# Patient Record
Sex: FEMALE | URBAN - METROPOLITAN AREA
[De-identification: names, ages, dates, MRNs, and addresses within clinical notes are randomized per-mention and may not be internally consistent; named-entity substitution may affect disease eponyms.]

---

## 2019-11-15 ENCOUNTER — APPOINTMENT (RX ONLY)
Dept: URBAN - METROPOLITAN AREA CLINIC 15 | Facility: CLINIC | Age: 24
Setting detail: DERMATOLOGY
End: 2019-11-15

## 2019-11-15 DIAGNOSIS — L30.8 OTHER SPECIFIED DERMATITIS: ICD-10-CM

## 2019-11-15 DIAGNOSIS — L29.89 OTHER PRURITUS: ICD-10-CM

## 2019-11-15 PROBLEM — L29.8 OTHER PRURITUS: Status: ACTIVE | Noted: 2019-11-15

## 2019-11-15 PROCEDURE — ? ADDITIONAL NOTES

## 2019-11-15 PROCEDURE — ? TREATMENT REGIMEN

## 2019-11-15 PROCEDURE — ? COUNSELING

## 2019-11-15 PROCEDURE — 99203 OFFICE O/P NEW LOW 30 MIN: CPT

## 2019-11-15 PROCEDURE — ? PRESCRIPTION

## 2019-11-15 RX ORDER — TRIAMCINOLONE ACETONIDE 1 MG/G
CREAM TOPICAL
Qty: 1 | Refills: 0 | Status: ERX | COMMUNITY
Start: 2019-11-15

## 2019-11-15 RX ADMIN — TRIAMCINOLONE ACETONIDE 1: 1 CREAM TOPICAL at 00:00

## 2019-11-15 ASSESSMENT — LOCATION ZONE DERM: LOCATION ZONE: LEG

## 2019-11-15 ASSESSMENT — LOCATION DETAILED DESCRIPTION DERM
LOCATION DETAILED: LEFT PROXIMAL LATERAL POSTERIOR THIGH
LOCATION DETAILED: LEFT DISTAL POSTERIOR THIGH
LOCATION DETAILED: LEFT ANTERIOR PROXIMAL THIGH

## 2019-11-15 ASSESSMENT — LOCATION SIMPLE DESCRIPTION DERM
LOCATION SIMPLE: LEFT THIGH
LOCATION SIMPLE: LEFT POSTERIOR THIGH

## 2019-11-15 ASSESSMENT — SEVERITY ASSESSMENT: SEVERITY: MILD

## 2019-11-15 NOTE — PROCEDURE: TREATMENT REGIMEN
Initiate Treatment: .\\nTriamcinolone 0.1% cream Apply to rash on left thigh twice daily for 10 days then once daily for 10 days
Detail Level: Zone
Initiate Treatment: .\\nTriamcinolone 0.1% cream Apply to rash on left thigh twice daily for 10 days then once daily for 10 days.

## 2019-11-15 NOTE — PROCEDURE: ADDITIONAL NOTES
Detail Level: Zone
Additional Notes: Patient presents today with a rash which she states that itâs secondary to an insect bite. The condition also have features of herpes zoster, but patient believe its insect bite.

## 2019-11-15 NOTE — PROCEDURE: MIPS QUALITY
Quality 110: Preventive Care And Screening: Influenza Immunization: Influenza immunization was not ordered or administered, reason not given
Detail Level: Detailed
Quality 131: Pain Assessment And Follow-Up: Pain assessment documented as positive using a standardized tool AND a follow-up plan is documented
Additional Notes: Patient states pain as positive and on a scale of 0-10 the pain level is 3/10. The provider has been informed for decision making and follow up.

## 2024-02-20 ENCOUNTER — APPOINTMENT (RX ONLY)
Dept: URBAN - METROPOLITAN AREA CLINIC 15 | Facility: CLINIC | Age: 29
Setting detail: DERMATOLOGY
End: 2024-02-20

## 2024-02-20 VITALS — HEIGHT: 63 IN | WEIGHT: 112 LBS

## 2024-02-20 DIAGNOSIS — L50.1 IDIOPATHIC URTICARIA: ICD-10-CM

## 2024-02-20 PROCEDURE — 99203 OFFICE O/P NEW LOW 30 MIN: CPT

## 2024-02-20 PROCEDURE — ? PRESCRIPTION MEDICATION MANAGEMENT

## 2024-02-20 PROCEDURE — ? COUNSELING

## 2024-02-20 ASSESSMENT — LOCATION DETAILED DESCRIPTION DERM
LOCATION DETAILED: RIGHT ANTERIOR DISTAL THIGH
LOCATION DETAILED: RIGHT POPLITEAL SKIN
LOCATION DETAILED: LEFT ANTERIOR PROXIMAL THIGH

## 2024-02-20 ASSESSMENT — LOCATION ZONE DERM: LOCATION ZONE: LEG

## 2024-02-20 ASSESSMENT — LOCATION SIMPLE DESCRIPTION DERM
LOCATION SIMPLE: RIGHT POPLITEAL SKIN
LOCATION SIMPLE: LEFT THIGH
LOCATION SIMPLE: RIGHT THIGH

## 2024-02-20 NOTE — PROCEDURE: PRESCRIPTION MEDICATION MANAGEMENT
Discontinue Regimen: -Ketoconazole cream\\n-Hydrocortisone cream\\n.
Render In Strict Bullet Format?: No
Plan: -Routing blood work and physical exam was recommended.\\n-Patient was advised to RTO if rash reappears
Initiate Treatment: .\\n\\n-Avoid Hot water showers \\n-Take Allegra in the Am as needed for flares \\n-Take Benadryl at night as needed for flares\\n-use sensitive skin soap \\n-Apply moisturizer on damp skin after showers. Cerave cream or LaRoche Posay Lipikar\\n.
Detail Level: Zone

## 2024-02-20 NOTE — HPI: RASH
What Type Of Note Output Would You Prefer (Optional)?: Bullet Format
Is This A New Presentation, Or A Follow-Up?: Rash
Additional History: Patient only used the hydrocortisone for two days until the itching was gone . She continue using the ketoconazole cream which has made the area better